# Patient Record
Sex: MALE | Race: WHITE | NOT HISPANIC OR LATINO
[De-identification: names, ages, dates, MRNs, and addresses within clinical notes are randomized per-mention and may not be internally consistent; named-entity substitution may affect disease eponyms.]

---

## 2022-12-27 PROBLEM — Z00.00 ENCOUNTER FOR PREVENTIVE HEALTH EXAMINATION: Status: ACTIVE | Noted: 2022-12-27

## 2023-03-30 ENCOUNTER — APPOINTMENT (OUTPATIENT)
Dept: NEUROLOGY | Facility: CLINIC | Age: 28
End: 2023-03-30
Payer: COMMERCIAL

## 2023-03-30 ENCOUNTER — NON-APPOINTMENT (OUTPATIENT)
Age: 28
End: 2023-03-30

## 2023-03-30 VITALS
HEART RATE: 71 BPM | OXYGEN SATURATION: 95 % | DIASTOLIC BLOOD PRESSURE: 63 MMHG | WEIGHT: 177 LBS | SYSTOLIC BLOOD PRESSURE: 122 MMHG | HEIGHT: 73 IN | BODY MASS INDEX: 23.46 KG/M2 | TEMPERATURE: 98.4 F

## 2023-03-30 DIAGNOSIS — R06.83 SNORING: ICD-10-CM

## 2023-03-30 DIAGNOSIS — G47.8 OTHER SLEEP DISORDERS: ICD-10-CM

## 2023-03-30 DIAGNOSIS — R40.0 SOMNOLENCE: ICD-10-CM

## 2023-03-30 PROCEDURE — 99204 OFFICE O/P NEW MOD 45 MIN: CPT

## 2023-04-20 NOTE — PHYSICAL EXAM
[FreeTextEntry1] : General:\par Constitutional:  Sitting comfortably in NAD.\par Psychiatric: well-groomed, appropriate affect, insight/judgment intact\par Ears, Nose, Throat: no abnormalities, mucus membranes moist\par Mallampati: \par Neck: supple, no lymphadenopathy or nodules palpable\par Neck Circumference:\par Cardiovascular: regular rate and rhythm, normal S1/S2, no murmurs \par Chest: Clear to bases. 	Abdomen: soft, non-tender\par Extremities: no edema, clubbing or cyanosis\par Skin:  no rash or neurocutaneous signs \par \par Cognitive:\par Orientation, language, memory and knowledge screens intact.\par Cranial Nerves:\par II: Full to confrontation; disc margins sharp. III/IV/VI: PERRL EOMF No nystagmus\par [id number]: Symmetric, VII: Face appears symmetric VIII: Normal to screening, IX/X: Palate Elevates Symmetrical  XI: Trapezius Symmetric  XII: Tongue midline\par \par Motor:\par Power: 5/5 throughout\par Tone: normal x 4 limbs, \par Tremor: none\par \par Sensation:  Intact to light touch. \par \par Coordination/Gait:\par Finger-nose-finger intact, normal rapid-alternating movements.\par Fine motor normal with normal rapid finger taps and heel tapping \par Narrow based gait, tandem forward ok\par Hops well on both feet\par Heel and toe walking normal \par Romberg negative\par \par Reflexes:\par DTR: 1-2+ symmetric x 4 limbs\par \par Plantar response: down x 2

## 2023-04-20 NOTE — HISTORY OF PRESENT ILLNESS
[FreeTextEntry1] : \par Low is a 26 yo presenting for sleep issues.\par \par Sleep issues, Onset was at least 8 years ago.\par Awakens 2x/night, often able to fall back asleep, but will get into cycles that he is unable to fall asleep again.\par Never feels refreshed upon awakening.  Super vivid dreams.  Rarely may kick or punch.\par \par Sleep Routine:\par Retires: 10-11:30pm\par Latency: quickly\par [place]:  2x night; bathroom or vivid dream.\par Awakens: 8:30-9am, feels unrefreshed.\par Naps:  cycles with daily naps required, may be 1.5-2 hours.  Often will feel refreshed.\par Snoring/air hunger:  minimal snoring.\par \par [place]:  \par Sleep/wake aids attempted/failed:  rarely melatonin.\par \par Sleep paralysis:  at times, not recently; recalls it occurring on a flight and being unable to push to wake up.\par Hypnagogic hypnopompic hallucinations:  no\par Cataplexy:\par \par Recurrent dreams of not being in a car heading to a wall but breaks are uanble to work.  Or being in mud or unable to lift things.\par \par Sees therapist for past year or so.  Did have panic attacks in the past.  Now on prozac 50mg for over a year now, and no panic attacks.\par \par FHx: paternal GF with similar sleep issues/non-restorative sleep; mother with insomnia as well.

## 2023-04-20 NOTE — DISCUSSION/SUMMARY
[FreeTextEntry1] : Impression:\par 1) sleep maintenance insomnia - non-restorative sleep but without hypersomnia\par 2) vivid dreams, sometimes contributing to the above.\par \par \par Plan:\par 1) home sleep apnea test due to mallampati 2/4 and history of snoring.\par 2) will stop TV watching 2 hours before sleep.\par 3) consider ambien 10mg as alternate. \par \par

## 2023-07-04 ENCOUNTER — NON-APPOINTMENT (OUTPATIENT)
Age: 28
End: 2023-07-04